# Patient Record
Sex: FEMALE | Race: WHITE | NOT HISPANIC OR LATINO | Employment: FULL TIME | URBAN - NONMETROPOLITAN AREA
[De-identification: names, ages, dates, MRNs, and addresses within clinical notes are randomized per-mention and may not be internally consistent; named-entity substitution may affect disease eponyms.]

---

## 2023-06-29 DIAGNOSIS — K21.9 GASTROESOPHAGEAL REFLUX DISEASE WITHOUT ESOPHAGITIS: ICD-10-CM

## 2023-06-29 RX ORDER — OMEPRAZOLE 40 MG/1
40 CAPSULE, DELAYED RELEASE ORAL DAILY
COMMUNITY
Start: 2023-02-15 | End: 2023-06-29 | Stop reason: SDUPTHER

## 2023-06-29 RX ORDER — OMEPRAZOLE 40 MG/1
40 CAPSULE, DELAYED RELEASE ORAL DAILY
Qty: 90 CAPSULE | Refills: 0 | Status: SHIPPED | OUTPATIENT
Start: 2023-06-29 | End: 2024-01-10 | Stop reason: SDUPTHER

## 2023-06-29 RX ORDER — AMLODIPINE BESYLATE 5 MG/1
5 TABLET ORAL DAILY
COMMUNITY
End: 2023-08-28 | Stop reason: SDUPTHER

## 2023-07-07 ENCOUNTER — OFFICE VISIT (OUTPATIENT)
Dept: PRIMARY CARE | Facility: CLINIC | Age: 58
End: 2023-07-07
Payer: COMMERCIAL

## 2023-07-07 VITALS
SYSTOLIC BLOOD PRESSURE: 135 MMHG | HEIGHT: 66 IN | HEART RATE: 86 BPM | WEIGHT: 170 LBS | OXYGEN SATURATION: 94 % | DIASTOLIC BLOOD PRESSURE: 76 MMHG | BODY MASS INDEX: 27.32 KG/M2

## 2023-07-07 DIAGNOSIS — Z12.31 ENCOUNTER FOR SCREENING MAMMOGRAM FOR MALIGNANT NEOPLASM OF BREAST: ICD-10-CM

## 2023-07-07 DIAGNOSIS — Z13.220 LIPID SCREENING: ICD-10-CM

## 2023-07-07 DIAGNOSIS — Z00.00 HEALTH CARE MAINTENANCE: ICD-10-CM

## 2023-07-07 DIAGNOSIS — R53.83 OTHER FATIGUE: ICD-10-CM

## 2023-07-07 PROBLEM — E78.00 HIGH CHOLESTEROL: Status: ACTIVE | Noted: 2023-07-07

## 2023-07-07 PROBLEM — J11.1 INFLUENZA: Status: RESOLVED | Noted: 2023-07-07 | Resolved: 2023-07-07

## 2023-07-07 PROBLEM — R09.89 CAROTID BRUIT: Status: ACTIVE | Noted: 2023-07-07

## 2023-07-07 PROBLEM — M25.511 RIGHT SHOULDER PAIN: Status: ACTIVE | Noted: 2023-07-07

## 2023-07-07 PROBLEM — J20.9 ACUTE BRONCHITIS: Status: RESOLVED | Noted: 2023-07-07 | Resolved: 2023-07-07

## 2023-07-07 PROBLEM — I10 HTN (HYPERTENSION): Status: ACTIVE | Noted: 2023-07-07

## 2023-07-07 PROBLEM — J01.00 ACUTE MAXILLARY SINUSITIS: Status: RESOLVED | Noted: 2023-07-07 | Resolved: 2023-07-07

## 2023-07-07 PROBLEM — H66.90 ACUTE OTITIS MEDIA: Status: RESOLVED | Noted: 2023-07-07 | Resolved: 2023-07-07

## 2023-07-07 PROBLEM — J02.8 ACUTE PHARYNGITIS DUE TO OTHER SPECIFIED ORGANISMS: Status: RESOLVED | Noted: 2023-07-07 | Resolved: 2023-07-07

## 2023-07-07 PROBLEM — J06.9 ACUTE UPPER RESPIRATORY INFECTION: Status: RESOLVED | Noted: 2023-07-07 | Resolved: 2023-07-07

## 2023-07-07 PROBLEM — H92.10 OTORRHEA: Status: ACTIVE | Noted: 2023-07-07

## 2023-07-07 PROBLEM — R42 DIZZINESS: Status: RESOLVED | Noted: 2023-07-07 | Resolved: 2023-07-07

## 2023-07-07 PROBLEM — M75.51 BURSITIS OF RIGHT SHOULDER: Status: ACTIVE | Noted: 2023-07-07

## 2023-07-07 PROBLEM — J02.9 SORE THROAT: Status: RESOLVED | Noted: 2023-07-07 | Resolved: 2023-07-07

## 2023-07-07 PROBLEM — R05.9 COUGH: Status: RESOLVED | Noted: 2023-07-07 | Resolved: 2023-07-07

## 2023-07-07 PROBLEM — R00.2 PALPITATIONS: Status: ACTIVE | Noted: 2023-07-07

## 2023-07-07 PROBLEM — H60.91 OTITIS EXTERNA OF RIGHT EAR: Status: ACTIVE | Noted: 2023-07-07

## 2023-07-07 PROCEDURE — 3075F SYST BP GE 130 - 139MM HG: CPT | Performed by: INTERNAL MEDICINE

## 2023-07-07 PROCEDURE — 1036F TOBACCO NON-USER: CPT | Performed by: INTERNAL MEDICINE

## 2023-07-07 PROCEDURE — 3078F DIAST BP <80 MM HG: CPT | Performed by: INTERNAL MEDICINE

## 2023-07-07 PROCEDURE — 99214 OFFICE O/P EST MOD 30 MIN: CPT | Performed by: INTERNAL MEDICINE

## 2023-07-07 ASSESSMENT — PATIENT HEALTH QUESTIONNAIRE - PHQ9
2. FEELING DOWN, DEPRESSED OR HOPELESS: NOT AT ALL
SUM OF ALL RESPONSES TO PHQ9 QUESTIONS 1 AND 2: 0
1. LITTLE INTEREST OR PLEASURE IN DOING THINGS: NOT AT ALL

## 2023-07-07 ASSESSMENT — ENCOUNTER SYMPTOMS: HYPERTENSION: 1

## 2023-07-07 NOTE — PROGRESS NOTES
Patient ID:   Ruchi Yun is a 58 y.o. female with PMH remarkable for HTN, HLD who presents to the office today for Follow-up and Hypertension.    Hypertension  This is a chronic problem. The current episode started more than 1 month ago. The problem has been waxing and waning since onset. The problem is controlled. Associated symptoms include anxiety. Past treatments include calcium channel blockers.     HEALTH MAINTENANCE: FOLLOW UP    SOCIAL HISTORY:  Social History     Tobacco Use    Smoking status: Never    Smokeless tobacco: Never   Substance Use Topics    Alcohol use: Never    Drug use: Never       IMMUNIZATIONS:  Immunization History   Administered Date(s) Administered    Moderna SARS-CoV-2 Vaccination 01/15/2021, 02/12/2021, 12/07/2021       REVIEW OF SYSTEMS:  Review of Systems   All other systems reviewed and are negative.    12 point review of systems negative unless stated above in HPI    ALLERGIES:  Allergies   Allergen Reactions    Penicillins Hives    Sulfa (Sulfonamide Antibiotics) Hives        VITAL SIGNS:  Vitals:    07/07/23 1215   BP: 135/76   Pulse: 86   SpO2: 94%       PHYSICAL EXAM:  Physical Exam  Vitals reviewed.   Constitutional:       General: She is not in acute distress.     Appearance: Normal appearance. She is not ill-appearing.   HENT:      Head: Normocephalic and atraumatic.      Right Ear: Tympanic membrane and external ear normal.      Left Ear: Tympanic membrane and external ear normal.      Nose: Nose normal.      Mouth/Throat:      Mouth: Mucous membranes are moist.      Pharynx: Oropharynx is clear.   Eyes:      Conjunctiva/sclera: Conjunctivae normal.      Pupils: Pupils are equal, round, and reactive to light.   Cardiovascular:      Rate and Rhythm: Normal rate and regular rhythm.      Heart sounds: Normal heart sounds. No murmur heard.  Pulmonary:      Effort: Pulmonary effort is normal. No respiratory distress.      Breath sounds: Normal breath sounds. No wheezing.    Abdominal:      General: There is no distension.      Palpations: Abdomen is soft. There is no mass.      Tenderness: There is no abdominal tenderness.   Musculoskeletal:         General: Normal range of motion.      Cervical back: Normal range of motion and neck supple.   Skin:     General: Skin is warm and dry.   Neurological:      General: No focal deficit present.      Mental Status: She is alert and oriented to person, place, and time.      Sensory: No sensory deficit.      Motor: No weakness.      Coordination: Coordination normal.      Gait: Gait normal.   Psychiatric:         Mood and Affect: Mood normal.         Behavior: Behavior normal.     MEDICATIONS:  Current Outpatient Medications on File Prior to Visit   Medication Sig Dispense Refill    amLODIPine (Norvasc) 5 mg tablet Take 1 tablet (5 mg) by mouth once daily.      omeprazole (PriLOSEC) 40 mg DR capsule Take 1 capsule (40 mg) by mouth once daily. 90 capsule 0     No current facility-administered medications on file prior to visit.      LABORATORY DATA:  Lab Results   Component Value Date    WBC 7.1 09/20/2021    HGB 13.6 09/20/2021    HCT 41.4 09/20/2021     09/20/2021    CHOL 241 (H) 12/26/2017    TRIG 68 12/26/2017    HDL 63.2 12/26/2017    ALT 9 12/26/2017    AST 15 12/26/2017     09/20/2021    K 4.1 09/20/2021     09/20/2021    CREATININE 0.95 09/20/2021    BUN 12 09/20/2021    CO2 30 09/20/2021       ASSESSMENT AND PLAN:  Assessment/Plan   Diagnoses and all orders for this visit:  Encounter for screening mammogram for malignant neoplasm of breast  -     BI mammo bilateral screening tomosynthesis; Future  Health care maintenance  -     CBC and Auto Differential; Future  -     Comprehensive Metabolic Panel; Future  -     TSH with reflex to Free T4 if abnormal; Future  -     Vitamin D, Total; Future  -     Vitamin B12; Future  Lipid screening  -     Lipid Panel; Future  Other fatigue  -     CBC and Auto Differential; Future  -      Comprehensive Metabolic Panel; Future  -     TSH with reflex to Free T4 if abnormal; Future  -     Vitamin D, Total; Future  -     Vitamin B12; Future      --------------------  Written by Traci Roldan RN, acting as a scribe for Dr. Wallis. This note accurately reflects the work and decisions made by Dr. Wallis.     I, Dr. Wallis, attest all medical record entries made by the scribe were under my direction and were personally dictated by me. I have reviewed the chart and agree that the record accurately reflects my performance of the history, physical exam, and assessment and plan.

## 2023-07-22 ENCOUNTER — LAB (OUTPATIENT)
Dept: LAB | Facility: LAB | Age: 58
End: 2023-07-22
Payer: COMMERCIAL

## 2023-07-22 DIAGNOSIS — Z13.220 LIPID SCREENING: ICD-10-CM

## 2023-07-22 DIAGNOSIS — R53.83 OTHER FATIGUE: ICD-10-CM

## 2023-07-22 DIAGNOSIS — Z00.00 HEALTH CARE MAINTENANCE: ICD-10-CM

## 2023-07-22 LAB
ALANINE AMINOTRANSFERASE (SGPT) (U/L) IN SER/PLAS: 16 U/L (ref 7–45)
ALBUMIN (G/DL) IN SER/PLAS: 4.3 G/DL (ref 3.4–5)
ALKALINE PHOSPHATASE (U/L) IN SER/PLAS: 76 U/L (ref 33–136)
ANION GAP IN SER/PLAS: 15 MMOL/L (ref 10–20)
ASPARTATE AMINOTRANSFERASE (SGOT) (U/L) IN SER/PLAS: 21 U/L (ref 9–39)
BASOPHILS (10*3/UL) IN BLOOD BY AUTOMATED COUNT: 0.04 X10E9/L (ref 0–0.1)
BASOPHILS/100 LEUKOCYTES IN BLOOD BY AUTOMATED COUNT: 0.7 % (ref 0–2)
BILIRUBIN TOTAL (MG/DL) IN SER/PLAS: 0.3 MG/DL (ref 0–1.2)
CALCIDIOL (25 OH VITAMIN D3) (NG/ML) IN SER/PLAS: 44 NG/ML
CALCIUM (MG/DL) IN SER/PLAS: 9 MG/DL (ref 8.6–10.3)
CARBON DIOXIDE, TOTAL (MMOL/L) IN SER/PLAS: 24 MMOL/L (ref 21–32)
CHLORIDE (MMOL/L) IN SER/PLAS: 104 MMOL/L (ref 98–107)
CHOLESTEROL (MG/DL) IN SER/PLAS: 230 MG/DL (ref 0–199)
CHOLESTEROL IN HDL (MG/DL) IN SER/PLAS: 57.6 MG/DL
CHOLESTEROL/HDL RATIO: 4
COBALAMIN (VITAMIN B12) (PG/ML) IN SER/PLAS: 302 PG/ML (ref 211–911)
CREATININE (MG/DL) IN SER/PLAS: 0.84 MG/DL (ref 0.5–1.05)
EOSINOPHILS (10*3/UL) IN BLOOD BY AUTOMATED COUNT: 0.08 X10E9/L (ref 0–0.7)
EOSINOPHILS/100 LEUKOCYTES IN BLOOD BY AUTOMATED COUNT: 1.4 % (ref 0–6)
ERYTHROCYTE DISTRIBUTION WIDTH (RATIO) BY AUTOMATED COUNT: 13.1 % (ref 11.5–14.5)
ERYTHROCYTE MEAN CORPUSCULAR HEMOGLOBIN CONCENTRATION (G/DL) BY AUTOMATED: 31.9 G/DL (ref 32–36)
ERYTHROCYTE MEAN CORPUSCULAR VOLUME (FL) BY AUTOMATED COUNT: 101 FL (ref 80–100)
ERYTHROCYTES (10*6/UL) IN BLOOD BY AUTOMATED COUNT: 4.02 X10E12/L (ref 4–5.2)
GFR FEMALE: 79 ML/MIN/1.73M2
GLUCOSE (MG/DL) IN SER/PLAS: 93 MG/DL (ref 74–99)
HEMATOCRIT (%) IN BLOOD BY AUTOMATED COUNT: 40.7 % (ref 36–46)
HEMOGLOBIN (G/DL) IN BLOOD: 13 G/DL (ref 12–16)
IMMATURE GRANULOCYTES/100 LEUKOCYTES IN BLOOD BY AUTOMATED COUNT: 0.2 % (ref 0–0.9)
LDL: 156 MG/DL (ref 0–99)
LEUKOCYTES (10*3/UL) IN BLOOD BY AUTOMATED COUNT: 5.5 X10E9/L (ref 4.4–11.3)
LYMPHOCYTES (10*3/UL) IN BLOOD BY AUTOMATED COUNT: 1.3 X10E9/L (ref 1.2–4.8)
LYMPHOCYTES/100 LEUKOCYTES IN BLOOD BY AUTOMATED COUNT: 23.6 % (ref 13–44)
MONOCYTES (10*3/UL) IN BLOOD BY AUTOMATED COUNT: 0.45 X10E9/L (ref 0.1–1)
MONOCYTES/100 LEUKOCYTES IN BLOOD BY AUTOMATED COUNT: 8.2 % (ref 2–10)
NEUTROPHILS (10*3/UL) IN BLOOD BY AUTOMATED COUNT: 3.64 X10E9/L (ref 1.2–7.7)
NEUTROPHILS/100 LEUKOCYTES IN BLOOD BY AUTOMATED COUNT: 65.9 % (ref 40–80)
PLATELETS (10*3/UL) IN BLOOD AUTOMATED COUNT: 298 X10E9/L (ref 150–450)
POTASSIUM (MMOL/L) IN SER/PLAS: 3.8 MMOL/L (ref 3.5–5.3)
PROTEIN TOTAL: 7.1 G/DL (ref 6.4–8.2)
SODIUM (MMOL/L) IN SER/PLAS: 139 MMOL/L (ref 136–145)
THYROTROPIN (MIU/L) IN SER/PLAS BY DETECTION LIMIT <= 0.05 MIU/L: 0.81 MIU/L (ref 0.44–3.98)
TRIGLYCERIDE (MG/DL) IN SER/PLAS: 80 MG/DL (ref 0–149)
UREA NITROGEN (MG/DL) IN SER/PLAS: 15 MG/DL (ref 6–23)
VLDL: 16 MG/DL (ref 0–40)

## 2023-07-22 PROCEDURE — 82306 VITAMIN D 25 HYDROXY: CPT

## 2023-07-22 PROCEDURE — 36415 COLL VENOUS BLD VENIPUNCTURE: CPT

## 2023-07-22 PROCEDURE — 82607 VITAMIN B-12: CPT

## 2023-07-22 PROCEDURE — 80053 COMPREHEN METABOLIC PANEL: CPT

## 2023-07-22 PROCEDURE — 85025 COMPLETE CBC W/AUTO DIFF WBC: CPT

## 2023-07-22 PROCEDURE — 80061 LIPID PANEL: CPT

## 2023-07-22 PROCEDURE — 84443 ASSAY THYROID STIM HORMONE: CPT

## 2023-07-24 DIAGNOSIS — E53.8 B12 DEFICIENCY: ICD-10-CM

## 2023-07-24 DIAGNOSIS — E78.5 DYSLIPIDEMIA: Primary | ICD-10-CM

## 2023-07-24 RX ORDER — UBIDECARENONE 75 MG
500 CAPSULE ORAL DAILY
Status: DISCONTINUED | OUTPATIENT
Start: 2023-07-24 | End: 2024-01-10 | Stop reason: ENTERED-IN-ERROR

## 2023-07-24 RX ORDER — ATORVASTATIN CALCIUM 20 MG/1
20 TABLET, FILM COATED ORAL DAILY
Qty: 30 TABLET | Refills: 5 | Status: SHIPPED | OUTPATIENT
Start: 2023-07-24 | End: 2023-11-29 | Stop reason: SDUPTHER

## 2023-07-25 DIAGNOSIS — E78.00 HIGH CHOLESTEROL: ICD-10-CM

## 2023-07-25 RX ORDER — ATORVASTATIN CALCIUM 20 MG/1
20 TABLET, FILM COATED ORAL DAILY
Qty: 90 TABLET | Refills: 0 | Status: SHIPPED | OUTPATIENT
Start: 2023-07-25 | End: 2024-01-10 | Stop reason: WASHOUT

## 2023-08-28 DIAGNOSIS — I10 HYPERTENSION, UNSPECIFIED TYPE: ICD-10-CM

## 2023-08-28 RX ORDER — AMLODIPINE BESYLATE 5 MG/1
5 TABLET ORAL DAILY
Qty: 90 TABLET | Refills: 0 | Status: SHIPPED | OUTPATIENT
Start: 2023-08-28 | End: 2023-11-29 | Stop reason: SDUPTHER

## 2023-11-29 DIAGNOSIS — E78.5 DYSLIPIDEMIA: ICD-10-CM

## 2023-11-29 DIAGNOSIS — I10 HYPERTENSION, UNSPECIFIED TYPE: ICD-10-CM

## 2023-11-29 RX ORDER — ATORVASTATIN CALCIUM 20 MG/1
20 TABLET, FILM COATED ORAL DAILY
Qty: 30 TABLET | Refills: 0 | Status: SHIPPED | OUTPATIENT
Start: 2023-11-29 | End: 2024-01-10 | Stop reason: SDUPTHER

## 2023-11-29 RX ORDER — AMLODIPINE BESYLATE 5 MG/1
5 TABLET ORAL DAILY
Qty: 30 TABLET | Refills: 0 | Status: SHIPPED | OUTPATIENT
Start: 2023-11-29 | End: 2024-01-10 | Stop reason: SDUPTHER

## 2024-01-10 ENCOUNTER — OFFICE VISIT (OUTPATIENT)
Dept: PRIMARY CARE | Facility: CLINIC | Age: 59
End: 2024-01-10
Payer: COMMERCIAL

## 2024-01-10 VITALS
WEIGHT: 169 LBS | HEART RATE: 85 BPM | RESPIRATION RATE: 16 BRPM | HEIGHT: 66 IN | OXYGEN SATURATION: 98 % | DIASTOLIC BLOOD PRESSURE: 78 MMHG | BODY MASS INDEX: 27.16 KG/M2 | SYSTOLIC BLOOD PRESSURE: 131 MMHG

## 2024-01-10 DIAGNOSIS — Z12.11 ENCOUNTER FOR SCREENING COLONOSCOPY: ICD-10-CM

## 2024-01-10 DIAGNOSIS — R35.0 FREQUENCY OF URINATION: ICD-10-CM

## 2024-01-10 DIAGNOSIS — I10 HYPERTENSION, UNSPECIFIED TYPE: ICD-10-CM

## 2024-01-10 DIAGNOSIS — R30.0 DYSURIA: ICD-10-CM

## 2024-01-10 DIAGNOSIS — E78.5 DYSLIPIDEMIA: ICD-10-CM

## 2024-01-10 DIAGNOSIS — K21.9 GASTROESOPHAGEAL REFLUX DISEASE WITHOUT ESOPHAGITIS: ICD-10-CM

## 2024-01-10 LAB
POC APPEARANCE, URINE: ABNORMAL
POC BILIRUBIN, URINE: NEGATIVE
POC BLOOD, URINE: ABNORMAL
POC COLOR, URINE: YELLOW
POC GLUCOSE, URINE: NEGATIVE MG/DL
POC KETONES, URINE: NEGATIVE MG/DL
POC LEUKOCYTES, URINE: ABNORMAL
POC NITRITE,URINE: NEGATIVE
POC PH, URINE: 5.5 PH
POC PROTEIN, URINE: NEGATIVE MG/DL
POC SPECIFIC GRAVITY, URINE: 1.01
POC UROBILINOGEN, URINE: 0.2 EU/DL

## 2024-01-10 PROCEDURE — 87086 URINE CULTURE/COLONY COUNT: CPT

## 2024-01-10 PROCEDURE — 1036F TOBACCO NON-USER: CPT | Performed by: INTERNAL MEDICINE

## 2024-01-10 PROCEDURE — 3078F DIAST BP <80 MM HG: CPT | Performed by: INTERNAL MEDICINE

## 2024-01-10 PROCEDURE — 99214 OFFICE O/P EST MOD 30 MIN: CPT | Performed by: INTERNAL MEDICINE

## 2024-01-10 PROCEDURE — 3075F SYST BP GE 130 - 139MM HG: CPT | Performed by: INTERNAL MEDICINE

## 2024-01-10 PROCEDURE — 81003 URINALYSIS AUTO W/O SCOPE: CPT | Performed by: INTERNAL MEDICINE

## 2024-01-10 RX ORDER — CIPROFLOXACIN 500 MG/1
500 TABLET ORAL 2 TIMES DAILY
Qty: 14 TABLET | Refills: 0 | Status: SHIPPED | OUTPATIENT
Start: 2024-01-10 | End: 2024-01-17

## 2024-01-10 RX ORDER — AMLODIPINE BESYLATE 5 MG/1
5 TABLET ORAL DAILY
Qty: 90 TABLET | Refills: 1 | Status: SHIPPED | OUTPATIENT
Start: 2024-01-10 | End: 2024-04-11 | Stop reason: SDUPTHER

## 2024-01-10 RX ORDER — ATORVASTATIN CALCIUM 20 MG/1
20 TABLET, FILM COATED ORAL DAILY
Qty: 90 TABLET | Refills: 1 | Status: SHIPPED | OUTPATIENT
Start: 2024-01-10 | End: 2024-04-11 | Stop reason: SDUPTHER

## 2024-01-10 RX ORDER — OMEPRAZOLE 40 MG/1
40 CAPSULE, DELAYED RELEASE ORAL DAILY
Qty: 90 CAPSULE | Refills: 1 | Status: SHIPPED | OUTPATIENT
Start: 2024-01-10 | End: 2024-04-11 | Stop reason: SDUPTHER

## 2024-01-10 ASSESSMENT — PATIENT HEALTH QUESTIONNAIRE - PHQ9
2. FEELING DOWN, DEPRESSED OR HOPELESS: NOT AT ALL
1. LITTLE INTEREST OR PLEASURE IN DOING THINGS: NOT AT ALL
SUM OF ALL RESPONSES TO PHQ9 QUESTIONS 1 AND 2: 0

## 2024-01-10 ASSESSMENT — ENCOUNTER SYMPTOMS
RESPIRATORY NEGATIVE: 1
NEUROLOGICAL NEGATIVE: 1
CARDIOVASCULAR NEGATIVE: 1
GASTROINTESTINAL NEGATIVE: 1
FREQUENCY: 1
CONSTITUTIONAL NEGATIVE: 1
PSYCHIATRIC NEGATIVE: 1
BACK PAIN: 1
DIFFICULTY URINATING: 1

## 2024-01-10 ASSESSMENT — PAIN SCALES - GENERAL: PAINLEVEL: 0-NO PAIN

## 2024-01-10 NOTE — PATIENT INSTRUCTIONS
It was great to see you in the office today! Here is what we discussed at your visit today:  Please continue to take your current medications   Schedule an appointment with Dr. Montes for possible EGD and colonoscopy  We have sent a prescription in for your urinary symptoms, Cipro, take as directed  We will call you with urine culture results  Follow up in four months

## 2024-01-10 NOTE — PROGRESS NOTES
Patient ID: She states she has been feeling ok. She states she has had shooting pain across her lower back, which has been on and off over past couple weeks. She states she has also had urinary frequency. She does not think she has had fever or chills. She denies any chest pain or cough. She denies any N/V/D/C. She states her job is active, but she does not exercise regularly. She states that she does not drink as much water as she should. She denies depression, states she sleeps ok. She states that her acid reflux has been worse,     HPI Ruchi Yun is a 58 y.o. female with PMH remarkable for HTN, HLD who presents to the office today for Follow-up, Urinary Frequency, Difficulty Urinating, and Back Pain (X2 weeks).    HEALTH MAINTENANCE: FOLLOW UP  Smoking: never smoker  Mammogram (40-75): 7/22/23  Pap smear (21-65): per OB/GYN  Labs: 7/22/23  Colonoscopy (45-75): 12/18/18   Lung cancer screening (55-80 + 30 pack year + smoking/quit in last 15 years): n/a    SOCIAL HISTORY:  Social History     Tobacco Use    Smoking status: Never    Smokeless tobacco: Never   Substance Use Topics    Alcohol use: Never    Drug use: Never     IMMUNIZATIONS:  Immunization History   Administered Date(s) Administered    Flu vaccine (IIV4), preservative free *Check age/dose* 11/10/2016    Influenza, injectable, quadrivalent 10/20/2021    Moderna SARS-CoV-2 Vaccination 01/15/2021, 02/12/2021, 12/07/2021    Novel influenza-H1N1-09, preservative-free 12/23/2009    Td (adult), unspecified 11/21/2002     REVIEW OF SYSTEMS:  Review of Systems   Constitutional: Negative.    Respiratory: Negative.     Cardiovascular: Negative.    Gastrointestinal: Negative.    Genitourinary:  Positive for difficulty urinating and frequency.   Musculoskeletal:  Positive for back pain.   Neurological: Negative.    Psychiatric/Behavioral: Negative.         ALLERGIES:  Allergies   Allergen Reactions    Penicillins Hives    Sulfa (Sulfonamide Antibiotics) Hives       VITAL SIGNS:  Vitals:    01/10/24 1219   BP: 131/78   Pulse: 85   Resp: 16   SpO2: 98%     Physical Exam  Vitals reviewed.   Constitutional:       Appearance: Normal appearance.   HENT:      Head: Normocephalic and atraumatic.   Cardiovascular:      Rate and Rhythm: Normal rate and regular rhythm.      Pulses: Normal pulses.      Heart sounds: Normal heart sounds.   Pulmonary:      Effort: Pulmonary effort is normal.      Breath sounds: Normal breath sounds.   Abdominal:      General: Bowel sounds are normal.      Palpations: Abdomen is soft.   Musculoskeletal:      Comments: Tenderness over left lower back   Skin:     General: Skin is warm and dry.   Neurological:      General: No focal deficit present.      Mental Status: She is alert and oriented to person, place, and time.   Psychiatric:         Mood and Affect: Mood normal.         Behavior: Behavior normal.       MEDICATIONS:  Current Outpatient Medications on File Prior to Visit   Medication Sig Dispense Refill    amLODIPine (Norvasc) 5 mg tablet Take 1 tablet (5 mg) by mouth once daily. 30 tablet 0    atorvastatin (Lipitor) 20 mg tablet Take 1 tablet (20 mg) by mouth once daily. 90 tablet 0    atorvastatin (Lipitor) 20 mg tablet Take 1 tablet (20 mg) by mouth once daily. 30 tablet 0    omeprazole (PriLOSEC) 40 mg DR capsule Take 1 capsule (40 mg) by mouth once daily. 90 capsule 0     Current Facility-Administered Medications on File Prior to Visit   Medication Dose Route Frequency Provider Last Rate Last Admin    cyanocobalamin (Vitamin B-12) tablet 500 mcg  500 mcg oral Daily Giuseppe Stpehenson MD          LABORATORY DATA:  Lab Results   Component Value Date    WBC 5.5 07/22/2023    HGB 13.0 07/22/2023    HCT 40.7 07/22/2023     07/22/2023    CHOL 230 (H) 07/22/2023    TRIG 80 07/22/2023    HDL 57.6 07/22/2023    ALT 16 07/22/2023    AST 21 07/22/2023     07/22/2023    K 3.8 07/22/2023     07/22/2023    CREATININE 0.84 07/22/2023     BUN 15 07/22/2023    CO2 24 07/22/2023    TSH 0.81 07/22/2023     ASSESSMENT AND PLAN:  Assessment/Plan   Diagnoses and all orders for this visit:  Dysuria  -     POCT UA Automated manually resulted  -     Urine Culture  -     ciprofloxacin (Cipro) 500 mg tablet; Take 1 tablet (500 mg) by mouth 2 times a day for 7 days.  Frequency of urination  -     POCT UA Automated manually resulted  -     Urine Culture  Hypertension, unspecified type  -     amLODIPine (Norvasc) 5 mg tablet; Take 1 tablet (5 mg) by mouth once daily.  -     BP today is good, 131/78  Dyslipidemia  -     atorvastatin (Lipitor) 20 mg tablet; Take 1 tablet (20 mg) by mouth once daily.  Gastroesophageal reflux disease without esophagitis  -     Referral to General Surgery; Future for screening colonoscopy, possible EGD as she is having increased acid reflux regardless of what she eats  -     omeprazole (PriLOSEC) 40 mg DR capsule; Take 1 capsule (40 mg) by mouth once daily.    --------------------  Written by Vihba Martinez LPN, acting as a scribe for Dr. Wallis. This note accurately reflects the work and decisions made by Dr. Wallis.     I, Dr. Wallis, attest all medical record entries made by the scribe were under my direction and were personally dictated by me. I have reviewed the chart and agree that the record accurately reflects my performance of the history, physical exam, and assessment and plan.

## 2024-01-12 LAB — BACTERIA UR CULT: NORMAL

## 2024-01-19 ENCOUNTER — OFFICE VISIT (OUTPATIENT)
Dept: SURGERY | Facility: CLINIC | Age: 59
End: 2024-01-19
Payer: COMMERCIAL

## 2024-01-19 VITALS
WEIGHT: 172 LBS | TEMPERATURE: 97 F | HEIGHT: 66 IN | SYSTOLIC BLOOD PRESSURE: 132 MMHG | HEART RATE: 77 BPM | BODY MASS INDEX: 27.64 KG/M2 | DIASTOLIC BLOOD PRESSURE: 70 MMHG

## 2024-01-19 DIAGNOSIS — K21.9 GASTROESOPHAGEAL REFLUX DISEASE WITHOUT ESOPHAGITIS: Primary | ICD-10-CM

## 2024-01-19 DIAGNOSIS — Z12.11 ENCOUNTER FOR SCREENING COLONOSCOPY: ICD-10-CM

## 2024-01-19 PROCEDURE — 1036F TOBACCO NON-USER: CPT | Performed by: SURGERY

## 2024-01-19 PROCEDURE — 99204 OFFICE O/P NEW MOD 45 MIN: CPT | Performed by: SURGERY

## 2024-01-19 PROCEDURE — 3078F DIAST BP <80 MM HG: CPT | Performed by: SURGERY

## 2024-01-19 PROCEDURE — 3075F SYST BP GE 130 - 139MM HG: CPT | Performed by: SURGERY

## 2024-01-19 ASSESSMENT — ENCOUNTER SYMPTOMS: NAUSEA: 1

## 2024-01-19 NOTE — PROGRESS NOTES
Subjective   Patient ID: Ruchi Yun is a 58 y.o. female who presents for epigastric discomfort and discussion regarding colonoscopy.    HPI     This patient presented with nonspecific epigastric discomfort feeling unwell nausea and sour taste in her stomach.  She also wanted to discuss a colonoscopy per her primary care physician.  She did have a normal colonoscopy in .  Recommendation made for 10-year follow-up.  She does not take any aspirin or ibuprofen.      Past Medical History:   Diagnosis Date    Acute maxillary sinusitis 2023    Acute otitis media 2023    Acute pharyngitis due to other specified organisms 2023    Acute upper respiratory infection 2023    Cough 2023    Dizziness 2023    Sore throat 2023        Current Outpatient Medications on File Prior to Visit   Medication Sig Dispense Refill    amLODIPine (Norvasc) 5 mg tablet Take 1 tablet (5 mg) by mouth once daily. 90 tablet 1    atorvastatin (Lipitor) 20 mg tablet Take 1 tablet (20 mg) by mouth once daily. 90 tablet 1    omeprazole (PriLOSEC) 40 mg DR capsule Take 1 capsule (40 mg) by mouth once daily. 90 capsule 1    [] ciprofloxacin (Cipro) 500 mg tablet Take 1 tablet (500 mg) by mouth 2 times a day for 7 days. 14 tablet 0     No current facility-administered medications on file prior to visit.        Review of Systems   Gastrointestinal:  Positive for nausea.   Also has heartburn.  Vitals:    24 0915   BP: 132/70   Pulse: 77   Temp: 36.1 °C (97 °F)        Objective     Physical Exam  Vitals reviewed. Exam conducted with a chaperone present.   Constitutional:       Appearance: Normal appearance.   HENT:      Head: Normocephalic.      Nose: Nose normal.      Mouth/Throat:      Pharynx: Oropharynx is clear.   Cardiovascular:      Rate and Rhythm: Normal rate and regular rhythm.      Heart sounds: Normal heart sounds.   Pulmonary:      Effort: Pulmonary effort is normal.       Breath sounds: Normal breath sounds and air entry.   Abdominal:      General: Abdomen is flat.      Palpations: Abdomen is soft. There is no mass.      Tenderness: There is no abdominal tenderness. There is no guarding.      Hernia: A hernia is present. Hernia is present in the umbilical area.      Comments: Small incarcerated vocal hernia   Musculoskeletal:         General: Normal range of motion.      Cervical back: Normal range of motion.   Skin:     General: Skin is warm.   Neurological:      General: No focal deficit present.      Mental Status: She is alert.   Psychiatric:         Mood and Affect: Mood normal.         Problem List Items Addressed This Visit    None  Visit Diagnoses       Gastroesophageal reflux disease without esophagitis        Encounter for screening colonoscopy                 Assessment/Plan   Discussed all the findings with the patient.  Continue proton pump inhibitor.  Will schedule EGD per patient request.  No indication for colonoscopy until 2028.  ESOPHAGOGASTRODODENOSCOPY/ BIOPSIES 2.13.24    Risks include, but not limited to pain, infection, bleeding, perforation,  missed lesions, aspiration, risk of cardiac, pulmonary, neurologic, locomotor, anesthetic events,  and other unforeseen complications including death.    Parish Montes MD

## 2024-01-19 NOTE — PATIENT INSTRUCTIONS
I will continue the oral proton pump inhibitor that you have been prescribed.  Will schedule your EGD for the 13th of February 2024.  Per current guidelines and after review of your colonoscopy you do not need another colonoscopy until the year of 2028.  My office will provide you with preoperative instructions for your procedure

## 2024-02-05 ENCOUNTER — ANESTHESIA EVENT (OUTPATIENT)
Dept: ANESTHESIOLOGY | Facility: HOSPITAL | Age: 59
End: 2024-02-05

## 2024-02-05 ENCOUNTER — PRE-ADMISSION TESTING (OUTPATIENT)
Dept: PREADMISSION TESTING | Facility: HOSPITAL | Age: 59
End: 2024-02-05
Payer: COMMERCIAL

## 2024-02-05 PROBLEM — K21.9 GASTROESOPHAGEAL REFLUX DISEASE: Status: ACTIVE | Noted: 2024-02-05

## 2024-02-05 ASSESSMENT — DUKE ACTIVITY SCORE INDEX (DASI)
CAN YOU DO HEAVY WORK AROUND THE HOUSE LIKE SCRUBBING FLOORS OR LIFTING AND MOVING HEAVY FURNITURE: YES
CAN YOU PARTICIPATE IN STRENOUS SPORTS LIKE SWIMMING, SINGLES TENNIS, FOOTBALL, BASKETBALL, OR SKIING: YES
CAN YOU PARTICIPATE IN MODERATE RECREATIONAL ACTIVITIES LIKE GOLF, BOWLING, DANCING, DOUBLES TENNIS OR THROWING A BASEBALL OR FOOTBALL: YES
CAN YOU DO LIGHT WORK AROUND THE HOUSE LIKE DUSTING OR WASHING DISHES: YES
TOTAL_SCORE: 58.2
CAN YOU DO MODERATE WORK AROUND THE HOUSE LIKE VACUUMING, SWEEPING FLOORS OR CARRYING GROCERIES: YES
CAN YOU DO YARD WORK LIKE RAKING LEAVES, WEEDING OR PUSHING A MOWER: YES
CAN YOU CLIMB A FLIGHT OF STAIRS OR WALK UP A HILL: YES
CAN YOU TAKE CARE OF YOURSELF (EAT, DRESS, BATHE, OR USE TOILET): YES
DASI METS SCORE: 9.9
CAN YOU HAVE SEXUAL RELATIONS: YES
CAN YOU RUN A SHORT DISTANCE: YES
CAN YOU WALK A BLOCK OR TWO ON LEVEL GROUND: YES
CAN YOU WALK INDOORS, SUCH AS AROUND YOUR HOUSE: YES

## 2024-02-05 NOTE — ANESTHESIA PREPROCEDURE EVALUATION
Patient: Ruchi Yun    Procedure Information    Date: 02/05/24  Reason: PAT         Relevant Problems   Anesthesia (within normal limits)      Cardiovascular   (+) HTN (hypertension)   (+) High cholesterol      Endocrine (within normal limits)      GI   (+) Gastroesophageal reflux disease      /Renal (within normal limits)      Neuro/Psych (within normal limits)      Pulmonary (within normal limits)      GI/Hepatic (within normal limits)      Hematology (within normal limits)      Musculoskeletal (within normal limits)      Eyes, Ears, Nose, and Throat (within normal limits)      Infectious Disease (within normal limits)       Clinical information reviewed:                 Chart reviewed.  No clearances ordered.      There were no vitals filed for this visit.    Past Surgical History:   Procedure Laterality Date    BACK SURGERY  04/16/2014    Back Surgery    MR HEAD ANGIO WO IV CONTRAST  9/20/2021    MR HEAD ANGIO WO IV CONTRAST 9/20/2021 [facility]    MR NECK ANGIO WO IV CONTRAST  9/20/2021    MR NECK ANGIO WO IV CONTRAST 9/20/2021 [facility]    OTHER SURGICAL HISTORY  11/28/2018    Foot surgery     Past Medical History:   Diagnosis Date    Acute maxillary sinusitis 07/07/2023    Acute otitis media 07/07/2023    Acute pharyngitis due to other specified organisms 07/07/2023    Acute upper respiratory infection 07/07/2023    Cough 07/07/2023    Dizziness 07/07/2023    Sore throat 07/07/2023       Current Outpatient Medications:     amLODIPine (Norvasc) 5 mg tablet, Take 1 tablet (5 mg) by mouth once daily., Disp: 90 tablet, Rfl: 1    atorvastatin (Lipitor) 20 mg tablet, Take 1 tablet (20 mg) by mouth once daily., Disp: 90 tablet, Rfl: 1    omeprazole (PriLOSEC) 40 mg DR capsule, Take 1 capsule (40 mg) by mouth once daily., Disp: 90 capsule, Rfl: 1  Prior to Admission medications    Medication Sig Start Date End Date Taking? Authorizing Provider   amLODIPine (Norvasc) 5 mg tablet Take 1  tablet (5 mg) by mouth once daily. 1/10/24   Giuseppe Stephenson MD   atorvastatin (Lipitor) 20 mg tablet Take 1 tablet (20 mg) by mouth once daily. 1/10/24 7/8/24  Giuseppe Stephenson MD   omeprazole (PriLOSEC) 40 mg DR capsule Take 1 capsule (40 mg) by mouth once daily. 1/10/24   Giuseppe Stephenson MD     Allergies   Allergen Reactions    Penicillins Hives    Sulfa (Sulfonamide Antibiotics) Hives     Social History     Tobacco Use    Smoking status: Never    Smokeless tobacco: Never   Substance Use Topics    Alcohol use: Never         Chemistry    Lab Results   Component Value Date/Time     07/22/2023 0956    K 3.8 07/22/2023 0956     07/22/2023 0956    CO2 24 07/22/2023 0956    BUN 15 07/22/2023 0956    CREATININE 0.84 07/22/2023 0956    Lab Results   Component Value Date/Time    CALCIUM 9.0 07/22/2023 0956    ALKPHOS 76 07/22/2023 0956    AST 21 07/22/2023 0956    ALT 16 07/22/2023 0956    BILITOT 0.3 07/22/2023 0956          Lab Results   Component Value Date/Time    WBC 5.5 07/22/2023 0956    HGB 13.0 07/22/2023 0956    HCT 40.7 07/22/2023 0956     07/22/2023 0956           NPO Detail:  No data recorded     PHYSICAL EXAM    Anesthesia Plan    History of general anesthesia?: yes  History of complications of general anesthesia?: no

## 2024-02-05 NOTE — PREPROCEDURE INSTRUCTIONS
Call outpatient surgery the day before between 1-3 pm to get your arrival time.   [phone]    No food the day before or day of procedure, only clear liquids. Stay hydrated.     Follow prep instructions.    You can have clear liquids up to 3 hrs prior to your procedure.  NO DAIRY, NO CREAMER, NO NON DAIRY OR ALMOND, OAT, COCONUT ETC.    Please refrain from smoking THC, cigarettes or vaping prior to your procedure at least 24 hrs.     When  you arrive park in the back ER parking lot.  Come through the ER lobby and take the first elevator to second floor.   Check in on the outpatient surgery window.    Leave all valuables at home and remove all piercing's.      NO driving for 24 hrs if you have had anesthesia or sedation.   You will also need a  if you are receiving sedation.    Bring glasses so you can read what you are signing.

## 2024-02-09 ENCOUNTER — PREP FOR PROCEDURE (OUTPATIENT)
Dept: SURGERY | Facility: HOSPITAL | Age: 59
End: 2024-02-09
Payer: COMMERCIAL

## 2024-02-09 RX ORDER — SODIUM CHLORIDE, SODIUM LACTATE, POTASSIUM CHLORIDE, CALCIUM CHLORIDE 600; 310; 30; 20 MG/100ML; MG/100ML; MG/100ML; MG/100ML
100 INJECTION, SOLUTION INTRAVENOUS CONTINUOUS
Status: CANCELLED | OUTPATIENT
Start: 2024-02-09

## 2024-02-09 RX ORDER — ONDANSETRON HYDROCHLORIDE 2 MG/ML
4 INJECTION, SOLUTION INTRAVENOUS ONCE AS NEEDED
Status: CANCELLED | OUTPATIENT
Start: 2024-02-09

## 2024-02-13 ENCOUNTER — ANESTHESIA (OUTPATIENT)
Dept: GASTROENTEROLOGY | Facility: HOSPITAL | Age: 59
End: 2024-02-13
Payer: COMMERCIAL

## 2024-02-13 ENCOUNTER — ANESTHESIA EVENT (OUTPATIENT)
Dept: GASTROENTEROLOGY | Facility: HOSPITAL | Age: 59
End: 2024-02-13
Payer: COMMERCIAL

## 2024-02-13 ENCOUNTER — HOSPITAL ENCOUNTER (OUTPATIENT)
Dept: GASTROENTEROLOGY | Facility: HOSPITAL | Age: 59
Setting detail: OUTPATIENT SURGERY
Discharge: HOME | End: 2024-02-13
Payer: COMMERCIAL

## 2024-02-13 VITALS
RESPIRATION RATE: 18 BRPM | OXYGEN SATURATION: 96 % | DIASTOLIC BLOOD PRESSURE: 53 MMHG | HEART RATE: 83 BPM | SYSTOLIC BLOOD PRESSURE: 108 MMHG | TEMPERATURE: 97.9 F

## 2024-02-13 DIAGNOSIS — K21.9 GASTROESOPHAGEAL REFLUX DISEASE WITHOUT ESOPHAGITIS: ICD-10-CM

## 2024-02-13 PROCEDURE — 43239 EGD BIOPSY SINGLE/MULTIPLE: CPT | Performed by: SURGERY

## 2024-02-13 PROCEDURE — 0753T DGTZ GLS MCRSCP SLD LEVEL IV: CPT | Mod: TC,SUR,GENLAB | Performed by: SURGERY

## 2024-02-13 PROCEDURE — 3700000001 HC GENERAL ANESTHESIA TIME - INITIAL BASE CHARGE

## 2024-02-13 PROCEDURE — 2500000004 HC RX 250 GENERAL PHARMACY W/ HCPCS (ALT 636 FOR OP/ED): Performed by: NURSE ANESTHETIST, CERTIFIED REGISTERED

## 2024-02-13 PROCEDURE — 2500000004 HC RX 250 GENERAL PHARMACY W/ HCPCS (ALT 636 FOR OP/ED): Performed by: SURGERY

## 2024-02-13 PROCEDURE — 7100000009 HC PHASE TWO TIME - INITIAL BASE CHARGE

## 2024-02-13 PROCEDURE — 7100000010 HC PHASE TWO TIME - EACH INCREMENTAL 1 MINUTE

## 2024-02-13 PROCEDURE — 88305 TISSUE EXAM BY PATHOLOGIST: CPT | Performed by: PATHOLOGY

## 2024-02-13 PROCEDURE — 3700000002 HC GENERAL ANESTHESIA TIME - EACH INCREMENTAL 1 MINUTE

## 2024-02-13 RX ORDER — PROPOFOL 10 MG/ML
INJECTION, EMULSION INTRAVENOUS AS NEEDED
Status: DISCONTINUED | OUTPATIENT
Start: 2024-02-13 | End: 2024-02-13

## 2024-02-13 RX ORDER — ONDANSETRON HYDROCHLORIDE 2 MG/ML
4 INJECTION, SOLUTION INTRAVENOUS ONCE AS NEEDED
Status: DISCONTINUED | OUTPATIENT
Start: 2024-02-13 | End: 2024-02-14 | Stop reason: HOSPADM

## 2024-02-13 RX ORDER — SODIUM CHLORIDE, SODIUM LACTATE, POTASSIUM CHLORIDE, CALCIUM CHLORIDE 600; 310; 30; 20 MG/100ML; MG/100ML; MG/100ML; MG/100ML
100 INJECTION, SOLUTION INTRAVENOUS CONTINUOUS
Status: DISCONTINUED | OUTPATIENT
Start: 2024-02-13 | End: 2024-02-14 | Stop reason: HOSPADM

## 2024-02-13 RX ADMIN — PROPOFOL 50 MG: 10 INJECTION, EMULSION INTRAVENOUS at 08:31

## 2024-02-13 RX ADMIN — SODIUM CHLORIDE, POTASSIUM CHLORIDE, SODIUM LACTATE AND CALCIUM CHLORIDE 100 ML/HR: 600; 310; 30; 20 INJECTION, SOLUTION INTRAVENOUS at 08:00

## 2024-02-13 RX ADMIN — SODIUM CHLORIDE, POTASSIUM CHLORIDE, SODIUM LACTATE AND CALCIUM CHLORIDE: 600; 310; 30; 20 INJECTION, SOLUTION INTRAVENOUS at 08:26

## 2024-02-13 RX ADMIN — PROPOFOL 30 MG: 10 INJECTION, EMULSION INTRAVENOUS at 08:30

## 2024-02-13 RX ADMIN — PROPOFOL 50 MG: 10 INJECTION, EMULSION INTRAVENOUS at 08:29

## 2024-02-13 RX ADMIN — PROPOFOL 110 MG: 10 INJECTION, EMULSION INTRAVENOUS at 08:28

## 2024-02-13 SDOH — HEALTH STABILITY: MENTAL HEALTH: CURRENT SMOKER: 0

## 2024-02-13 ASSESSMENT — PAIN - FUNCTIONAL ASSESSMENT
PAIN_FUNCTIONAL_ASSESSMENT: 0-10

## 2024-02-13 ASSESSMENT — COLUMBIA-SUICIDE SEVERITY RATING SCALE - C-SSRS
6. HAVE YOU EVER DONE ANYTHING, STARTED TO DO ANYTHING, OR PREPARED TO DO ANYTHING TO END YOUR LIFE?: NO
1. IN THE PAST MONTH, HAVE YOU WISHED YOU WERE DEAD OR WISHED YOU COULD GO TO SLEEP AND NOT WAKE UP?: NO
2. HAVE YOU ACTUALLY HAD ANY THOUGHTS OF KILLING YOURSELF?: NO

## 2024-02-13 ASSESSMENT — PAIN SCALES - GENERAL
PAINLEVEL_OUTOF10: 0 - NO PAIN
PAIN_LEVEL: 0
PAINLEVEL_OUTOF10: 0 - NO PAIN
PAINLEVEL_OUTOF10: 0 - NO PAIN

## 2024-02-13 NOTE — ANESTHESIA PREPROCEDURE EVALUATION
Patient: Ruchi Yun    Procedure Information       Date/Time: 02/13/24 0830    Scheduled providers: Parish Montes MD    Procedure: EGD    Location: [facility]            Relevant Problems   Cardiovascular   (+) HTN (hypertension)   (+) High cholesterol      GI   (+) Gastroesophageal reflux disease       Clinical information reviewed:   Tobacco  Allergies  Meds   Med Hx  Surg Hx   Fam Hx  Soc Hx        NPO Detail:  NPO/Void Status  Date of Last Liquid: 02/13/24  Time of Last Liquid: 0500  Date of Last Solid: 02/12/24  Time of Last Solid: 1900         Physical Exam    Airway  Mallampati: II  TM distance: >3 FB  Neck ROM: full     Cardiovascular   Rhythm: regular  Rate: normal     Dental    Pulmonary   (+) wheezes     Abdominal   Abdomen: soft             Anesthesia Plan    History of general anesthesia?: yes  History of complications of general anesthesia?: no    ASA 3     MAC     The patient is not a current smoker.    intravenous induction   Anesthetic plan and risks discussed with patient.  Use of blood products discussed with patient who.    Plan discussed with attending.

## 2024-02-13 NOTE — H&P
History Of Present Illness  Ruchi Yun is a 58 y.o. female presenting for epigastric pain      Past Medical History  Past Medical History:   Diagnosis Date    Acute maxillary sinusitis 07/07/2023    Acute otitis media 07/07/2023    Acute pharyngitis due to other specified organisms 07/07/2023    Acute upper respiratory infection 07/07/2023    Cough 07/07/2023    Dizziness 07/07/2023    Sore throat 07/07/2023       Surgical History  Past Surgical History:   Procedure Laterality Date    BACK SURGERY  04/16/2014    Back Surgery    ENDOMETRIAL ABLATION      MR HEAD ANGIO WO IV CONTRAST  09/20/2021    MR HEAD ANGIO WO IV CONTRAST 9[facility]20/2021 [facility]    MR NECK ANGIO WO IV CONTRAST  09/20/2021    MR NECK ANGIO WO IV CONTRAST 9/20/2021 [facility]    OTHER SURGICAL HISTORY  11/28/2018    Foot surgery        Social History  She reports that she has never smoked. She has never used smokeless tobacco. She reports that she does not drink alcohol and does not use drugs.    Family History  Family History   Problem Relation Name Age of Onset    Diabetes Mother      Heart disease Father          Allergies  Penicillins and Sulfa (sulfonamide antibiotics)    Review of Systems   All other systems reviewed and are negative.       Physical Exam  Constitutional:       Appearance: Normal appearance.   Cardiovascular:      Heart sounds: Normal heart sounds.   Pulmonary:      Breath sounds: Normal breath sounds and air entry.   Abdominal:      General: Abdomen is flat.      Palpations: Abdomen is soft.      Tenderness: There is no abdominal tenderness.   Neurological:      Mental Status: She is alert.          Last Recorded Vitals  There were no vitals taken for this visit.    Relevant Results           Assessment/Plan   Active Problems: Epigastric Pain   There are no active Hospital Problems.      ESOPHAGOGASTRODODENOSCOPY/ BIOPSIES   Risks include, but not limited to pain, infection, bleeding, perforation,   missed lesions, aspiration, risk of cardiac, pulmonary, neurologic, locomotor, anesthetic events,  and other unforeseen complications including death.      Parish Montes MD

## 2024-02-13 NOTE — DISCHARGE INSTRUCTIONS

## 2024-02-13 NOTE — ANESTHESIA POSTPROCEDURE EVALUATION
Patient: Ruchi Yun    Procedure Summary       Date: 02/13/24 Room / Location: [facility]    Anesthesia Start: 0824 Anesthesia Stop: 0842    Procedure: EGD Diagnosis: Gastroesophageal reflux disease without esophagitis    Scheduled Providers: Parish Montes MD Responsible Provider: MELVIN Bautista    Anesthesia Type: MAC ASA Status: 3            Anesthesia Type: MAC    Vitals Value Taken Time   /53 02/13/24 0847   Temp 36.6 °C (97.9 °F) 02/13/24 0837   Pulse 83 02/13/24 0847   Resp 18 02/13/24 0847   SpO2 96 % 02/13/24 0847       Anesthesia Post Evaluation    Patient location during evaluation: bedside  Patient participation: complete - patient participated  Level of consciousness: awake and alert  Pain score: 0  Pain management: adequate  Airway patency: patent  Cardiovascular status: acceptable  Respiratory status: acceptable  Hydration status: acceptable  Postoperative Nausea and Vomiting: none        No notable events documented.

## 2024-02-15 NOTE — ADDENDUM NOTE
Encounter addended by: Robbie Schultz RN on: 2/15/2024 3:10 PM   Actions taken: Contacts section saved

## 2024-02-20 ENCOUNTER — TELEPHONE (OUTPATIENT)
Dept: SURGERY | Facility: CLINIC | Age: 59
End: 2024-02-20
Payer: COMMERCIAL

## 2024-02-20 LAB
LABORATORY COMMENT REPORT: NORMAL
PATH REPORT.FINAL DX SPEC: NORMAL
PATH REPORT.GROSS SPEC: NORMAL
PATH REPORT.TOTAL CANCER: NORMAL

## 2024-02-20 NOTE — TELEPHONE ENCOUNTER
----- Message from Parish Montes MD sent at 2/20/2024  3:05 PM EST -----  Let her know the biopsies were normal

## 2024-04-11 DIAGNOSIS — K21.9 GASTROESOPHAGEAL REFLUX DISEASE WITHOUT ESOPHAGITIS: ICD-10-CM

## 2024-04-11 DIAGNOSIS — E78.5 DYSLIPIDEMIA: ICD-10-CM

## 2024-04-11 DIAGNOSIS — I10 HYPERTENSION, UNSPECIFIED TYPE: ICD-10-CM

## 2024-04-11 RX ORDER — AMLODIPINE BESYLATE 5 MG/1
5 TABLET ORAL DAILY
Qty: 90 TABLET | Refills: 0 | Status: SHIPPED | OUTPATIENT
Start: 2024-04-11

## 2024-04-11 RX ORDER — OMEPRAZOLE 40 MG/1
40 CAPSULE, DELAYED RELEASE ORAL DAILY
Qty: 90 CAPSULE | Refills: 0 | Status: SHIPPED | OUTPATIENT
Start: 2024-04-11

## 2024-04-11 RX ORDER — ATORVASTATIN CALCIUM 20 MG/1
20 TABLET, FILM COATED ORAL DAILY
Qty: 90 TABLET | Refills: 0 | Status: SHIPPED | OUTPATIENT
Start: 2024-04-11 | End: 2024-10-08

## 2024-07-10 DIAGNOSIS — K21.9 GASTROESOPHAGEAL REFLUX DISEASE WITHOUT ESOPHAGITIS: ICD-10-CM

## 2024-07-10 DIAGNOSIS — I10 HYPERTENSION, UNSPECIFIED TYPE: ICD-10-CM

## 2024-07-10 DIAGNOSIS — E78.5 DYSLIPIDEMIA: ICD-10-CM

## 2024-07-10 RX ORDER — ATORVASTATIN CALCIUM 20 MG/1
20 TABLET, FILM COATED ORAL DAILY
Qty: 90 TABLET | Refills: 0 | Status: SHIPPED | OUTPATIENT
Start: 2024-07-10 | End: 2025-01-06

## 2024-07-10 RX ORDER — OMEPRAZOLE 40 MG/1
40 CAPSULE, DELAYED RELEASE ORAL DAILY
Qty: 90 CAPSULE | Refills: 0 | Status: SHIPPED | OUTPATIENT
Start: 2024-07-10

## 2024-07-10 RX ORDER — AMLODIPINE BESYLATE 5 MG/1
5 TABLET ORAL DAILY
Qty: 90 TABLET | Refills: 0 | Status: SHIPPED | OUTPATIENT
Start: 2024-07-10

## 2024-08-06 ENCOUNTER — APPOINTMENT (OUTPATIENT)
Dept: PRIMARY CARE | Facility: CLINIC | Age: 59
End: 2024-08-06
Payer: COMMERCIAL

## 2024-08-06 VITALS
SYSTOLIC BLOOD PRESSURE: 135 MMHG | HEIGHT: 66 IN | RESPIRATION RATE: 18 BRPM | DIASTOLIC BLOOD PRESSURE: 85 MMHG | OXYGEN SATURATION: 97 % | HEART RATE: 78 BPM | WEIGHT: 170 LBS | BODY MASS INDEX: 27.32 KG/M2

## 2024-08-06 DIAGNOSIS — R01.1 MURMUR: ICD-10-CM

## 2024-08-06 DIAGNOSIS — Z12.39 BREAST SCREENING: ICD-10-CM

## 2024-08-06 DIAGNOSIS — Z13.220 LIPID SCREENING: ICD-10-CM

## 2024-08-06 DIAGNOSIS — I15.9 SECONDARY HYPERTENSION: ICD-10-CM

## 2024-08-06 DIAGNOSIS — R42 DIZZINESS: ICD-10-CM

## 2024-08-06 DIAGNOSIS — R00.2 PALPITATIONS: ICD-10-CM

## 2024-08-06 DIAGNOSIS — Z00.00 HEALTH CARE MAINTENANCE: ICD-10-CM

## 2024-08-06 DIAGNOSIS — E78.5 DYSLIPIDEMIA: ICD-10-CM

## 2024-08-06 PROCEDURE — 99214 OFFICE O/P EST MOD 30 MIN: CPT | Performed by: INTERNAL MEDICINE

## 2024-08-06 PROCEDURE — 3079F DIAST BP 80-89 MM HG: CPT | Performed by: INTERNAL MEDICINE

## 2024-08-06 PROCEDURE — 3008F BODY MASS INDEX DOCD: CPT | Performed by: INTERNAL MEDICINE

## 2024-08-06 PROCEDURE — 1036F TOBACCO NON-USER: CPT | Performed by: INTERNAL MEDICINE

## 2024-08-06 PROCEDURE — 3075F SYST BP GE 130 - 139MM HG: CPT | Performed by: INTERNAL MEDICINE

## 2024-08-06 RX ORDER — MAGNESIUM 250 MG
1 TABLET ORAL DAILY
COMMUNITY

## 2024-08-06 RX ORDER — LANOLIN ALCOHOL/MO/W.PET/CERES
1000 CREAM (GRAM) TOPICAL DAILY
COMMUNITY

## 2024-08-06 ASSESSMENT — PATIENT HEALTH QUESTIONNAIRE - PHQ9
SUM OF ALL RESPONSES TO PHQ9 QUESTIONS 1 AND 2: 0
1. LITTLE INTEREST OR PLEASURE IN DOING THINGS: NOT AT ALL
2. FEELING DOWN, DEPRESSED OR HOPELESS: NOT AT ALL

## 2024-08-06 ASSESSMENT — PAIN SCALES - GENERAL: PAINLEVEL: 0-NO PAIN

## 2024-08-06 NOTE — PATIENT INSTRUCTIONS
It was nice to see you in the office today!  As discussed during our visit...     Please have your blood drawn in the next 1-2 weeks.   You need to be FASTING for 12 hours prior to blood draw.  You may have BLACK coffee, BLACK tea and/or water at any time during the fasting time.  We will contact you with the results of your blood work and any necessary adjustments to your plan of care.  If you do not hear from us within 3-5 days of having your blood drawn, please call the [place] office at [phone].     The two closest Outpatient Lab's to this office is:  [facility]  [address] [place] [address]    Hours:   Monday through Friday 7:30am - 4:30pm (Closed 12:30-1pm for lunch)     Or you can go to ...  [facility]  [address]  [place] [address] [phone]    Hours:   Monday through Friday 7:30am - 4:30pm (Closed 12:30-1pm for lunch)   Saturday 8am - 12pm    Website to confirm hours and location OR look up more locations ... [phone]     --------------------------------------------------------  Dr Wallis has ordered a mammogram for you to obtain. This will take approximately 30 minutes. You can schedule this on the [facility] website or through the link below.    Breast cancer cannot be prevented but early detection can keep the disease from spreading and greatly increase your odds for a favorable outcome.    Patients with dense breast tissue, those who have an average or mildly elevated risk for developing breast cancer or those who have a family history of the disease can schedule a low-cost breast cancer screening that takes approximately 10 minutes. This supplemental screening is self-pay ($250*) and is available at several  locations that perform breast MRIs. To learn more about the Fast MRI program, call [phone].    Website with more information and  access to schedule online:  [email or web address]

## 2024-08-06 NOTE — PROGRESS NOTES
"Patient ID:   Ruchi Yun is a 59 y.o. female with PMH remarkable for HTN, HLD who presents to the office today for Follow-up (6 month/Statin giving her leg cramps) and Dizziness.    HEALTH MAINTENANCE: FOLLOW UP   Last Office Visit: 1/10/2024 for FU and c/p back pain, increased urinary frequency. Referral to general surgery was placed for screening colonoscopy and possible EGD d/t increased GERD. Urinalysis and culture done (-ve). Cipro was ordered for UTI.   Mammogram (40-75): 7/22/2023 -ve -> DUE  Pap smear (21-65, or hysterectomy q5yrs): 2021 -ve  Last Labs: 7/22/2023 -> DUE  Colonoscopy (45-75 or age 40 with 1st degree relative dx colon ca): 2018. Had an EGD on 2/13/2024 with Dr Montes.   Carotid US 1/6/2022 showing <50% bilaterally    Increased stress at work.  Has been having dizziness/lightheadedness. Took her BP and it was >140 SBP.   She drank a lot of water and rehydrated herself therefore felt improved.  For ~1.5 wks she has felt dizzy to where she has to sit on the edge of the bed.    Social History     Tobacco Use    Smoking status: Never    Smokeless tobacco: Never   Vaping Use    Vaping status: Never Used   Substance Use Topics    Alcohol use: Never    Drug use: Never     Review of Systems   Musculoskeletal:  Positive for myalgias.   Neurological:  Positive for light-headedness.   All other systems reviewed and are negative.    Visit Vitals  /85 (BP Location: Left arm, Patient Position: Sitting)   Pulse 78   Resp 18   Ht 1.676 m (5' 6\")   Wt 77.1 kg (170 lb)   SpO2 97%   BMI 27.44 kg/m²   OB Status Postmenopausal   Smoking Status Never   BSA 1.89 m²     Physical Exam  Vitals reviewed.   Constitutional:       General: She is not in acute distress.     Appearance: Normal appearance. She is not ill-appearing.   HENT:      Head: Normocephalic and atraumatic.      Right Ear: Tympanic membrane and external ear normal.      Left Ear: Tympanic membrane and external ear normal.      Nose: Nose " normal.      Mouth/Throat:      Mouth: Mucous membranes are moist.      Pharynx: Oropharynx is clear.   Eyes:      Conjunctiva/sclera: Conjunctivae normal.      Pupils: Pupils are equal, round, and reactive to light.   Cardiovascular:      Rate and Rhythm: Normal rate and regular rhythm.      Heart sounds: Murmur heard.   Pulmonary:      Effort: Pulmonary effort is normal. No respiratory distress.      Breath sounds: Normal breath sounds. No wheezing.   Abdominal:      General: There is no distension.      Palpations: Abdomen is soft. There is no mass.      Tenderness: There is no abdominal tenderness.   Musculoskeletal:         General: Normal range of motion.      Cervical back: Normal range of motion and neck supple.   Skin:     General: Skin is warm and dry.   Neurological:      General: No focal deficit present.      Mental Status: She is alert and oriented to person, place, and time.      Sensory: No sensory deficit.      Motor: No weakness.      Coordination: Coordination normal.      Gait: Gait normal.   Psychiatric:         Mood and Affect: Mood normal.         Behavior: Behavior normal.       Current Outpatient Medications   Medication Instructions    amLODIPine (NORVASC) 5 mg, oral, Daily    atorvastatin (LIPITOR) 20 mg, oral, Daily    cyanocobalamin (VITAMIN B-12) 1,000 mcg, oral, Daily    L.acidoph/B.animalis/B.longum (FLORAJEN DIGESTION ORAL) 1 capsule, oral, Daily    magnesium 250 mg tablet 1 tablet, oral, Daily    omeprazole (PRILOSEC) 40 mg, oral, Daily      Lab Results   Component Value Date    WBC 5.5 07/22/2023    HGB 13.0 07/22/2023    HCT 40.7 07/22/2023     07/22/2023    CHOL 230 (H) 07/22/2023    TRIG 80 07/22/2023    HDL 57.6 07/22/2023    ALT 16 07/22/2023    AST 21 07/22/2023     07/22/2023    K 3.8 07/22/2023     07/22/2023    CREATININE 0.84 07/22/2023    BUN 15 07/22/2023    CO2 24 07/22/2023    TSH 0.81 07/22/2023     Problem List Items Addressed This Visit              ICD-10-CM    HTN (hypertension) I10     - c/w amlodipine         Palpitations R00.2    Relevant Orders    Transthoracic Echo (TTE) Complete    Murmur R01.1     - will get echocardiogram         Relevant Orders    Transthoracic Echo (TTE) Complete    Dyslipidemia E78.5     - c/w statin         Relevant Orders    Lipid Panel    Health care maintenance Z00.00     - will check labs         Relevant Orders    CBC and Auto Differential    Comprehensive Metabolic Panel    Lipid Panel    TSH with reflex to Free T4 if abnormal    Vitamin D 25-Hydroxy,Total (for eval of Vitamin D levels)    Vitamin B12    Dizziness R42     Increased stress at work.  Has been having dizziness/lightheadedness. Took her BP and it was >140 SBP.   She drank a lot of water and rehydrated herself therefore felt improved.  For ~1.5 wks she has felt dizzy to where she has to sit on the edge of the bed.          Other Visit Diagnoses         Codes    Lipid screening     Z13.220    Relevant Orders    Lipid Panel    Breast screening     Z12.39    Relevant Orders    BI mammo bilateral screening tomosynthesis            --------------------  Written by Traci Roldan RN, acting as a scribe for Dr. Wallis. This note accurately reflects the work and decisions made by Dr. Wallis.     I, Dr. Wallis, attest all medical record entries made by the scribe were under my direction and were personally dictated by me. I have reviewed the chart and agree that the record accurately reflects my performance of the history, physical exam, and assessment and plan.

## 2024-08-09 PROBLEM — R42 DIZZINESS: Status: ACTIVE | Noted: 2024-08-09

## 2024-08-09 PROBLEM — R01.1 MURMUR: Status: ACTIVE | Noted: 2024-08-09

## 2024-08-09 PROBLEM — Z00.00 HEALTH CARE MAINTENANCE: Status: ACTIVE | Noted: 2024-08-09

## 2024-08-09 PROBLEM — E78.5 DYSLIPIDEMIA: Status: ACTIVE | Noted: 2024-08-09

## 2024-08-09 ASSESSMENT — ENCOUNTER SYMPTOMS
MYALGIAS: 1
LIGHT-HEADEDNESS: 1

## 2024-08-09 NOTE — ASSESSMENT & PLAN NOTE
Increased stress at work.  Has been having dizziness/lightheadedness. Took her BP and it was >140 SBP.   She drank a lot of water and rehydrated herself therefore felt improved.  For ~1.5 wks she has felt dizzy to where she has to sit on the edge of the bed.

## 2024-08-17 ENCOUNTER — LAB (OUTPATIENT)
Dept: LAB | Facility: LAB | Age: 59
End: 2024-08-17
Payer: COMMERCIAL

## 2024-08-17 DIAGNOSIS — Z00.00 HEALTH CARE MAINTENANCE: ICD-10-CM

## 2024-08-17 DIAGNOSIS — E78.5 DYSLIPIDEMIA: ICD-10-CM

## 2024-08-17 DIAGNOSIS — Z13.220 LIPID SCREENING: ICD-10-CM

## 2024-08-17 LAB
25(OH)D3 SERPL-MCNC: 42 NG/ML (ref 30–100)
ALBUMIN SERPL BCP-MCNC: 4 G/DL (ref 3.4–5)
ALP SERPL-CCNC: 85 U/L (ref 33–136)
ALT SERPL W P-5'-P-CCNC: 12 U/L (ref 7–45)
ANION GAP SERPL CALC-SCNC: 8 MMOL/L (ref 10–20)
AST SERPL W P-5'-P-CCNC: 16 U/L (ref 9–39)
BASOPHILS # BLD AUTO: 0.04 X10*3/UL (ref 0–0.1)
BASOPHILS NFR BLD AUTO: 0.7 %
BILIRUB SERPL-MCNC: 0.4 MG/DL (ref 0–1.2)
BUN SERPL-MCNC: 12 MG/DL (ref 6–23)
CALCIUM SERPL-MCNC: 9.4 MG/DL (ref 8.6–10.3)
CHLORIDE SERPL-SCNC: 106 MMOL/L (ref 98–107)
CHOLEST SERPL-MCNC: 135 MG/DL (ref 0–199)
CHOLESTEROL/HDL RATIO: 2.5
CO2 SERPL-SCNC: 30 MMOL/L (ref 21–32)
CREAT SERPL-MCNC: 0.86 MG/DL (ref 0.5–1.05)
EGFRCR SERPLBLD CKD-EPI 2021: 77 ML/MIN/1.73M*2
EOSINOPHIL # BLD AUTO: 0.08 X10*3/UL (ref 0–0.7)
EOSINOPHIL NFR BLD AUTO: 1.4 %
ERYTHROCYTE [DISTWIDTH] IN BLOOD BY AUTOMATED COUNT: 13.2 % (ref 11.5–14.5)
GLUCOSE SERPL-MCNC: 88 MG/DL (ref 74–99)
HCT VFR BLD AUTO: 41.3 % (ref 36–46)
HDLC SERPL-MCNC: 53.6 MG/DL
HGB BLD-MCNC: 12.8 G/DL (ref 12–16)
IMM GRANULOCYTES # BLD AUTO: 0.01 X10*3/UL (ref 0–0.7)
IMM GRANULOCYTES NFR BLD AUTO: 0.2 % (ref 0–0.9)
LDLC SERPL CALC-MCNC: 65 MG/DL
LYMPHOCYTES # BLD AUTO: 1.53 X10*3/UL (ref 1.2–4.8)
LYMPHOCYTES NFR BLD AUTO: 26.6 %
MCH RBC QN AUTO: 31.4 PG (ref 26–34)
MCHC RBC AUTO-ENTMCNC: 31 G/DL (ref 32–36)
MCV RBC AUTO: 102 FL (ref 80–100)
MONOCYTES # BLD AUTO: 0.4 X10*3/UL (ref 0.1–1)
MONOCYTES NFR BLD AUTO: 7 %
NEUTROPHILS # BLD AUTO: 3.69 X10*3/UL (ref 1.2–7.7)
NEUTROPHILS NFR BLD AUTO: 64.1 %
NON HDL CHOLESTEROL: 81 MG/DL (ref 0–149)
NRBC BLD-RTO: 0 /100 WBCS (ref 0–0)
PLATELET # BLD AUTO: 284 X10*3/UL (ref 150–450)
POTASSIUM SERPL-SCNC: 4 MMOL/L (ref 3.5–5.3)
PROT SERPL-MCNC: 7 G/DL (ref 6.4–8.2)
RBC # BLD AUTO: 4.07 X10*6/UL (ref 4–5.2)
SODIUM SERPL-SCNC: 140 MMOL/L (ref 136–145)
TRIGL SERPL-MCNC: 82 MG/DL (ref 0–149)
TSH SERPL-ACNC: 1.07 MIU/L (ref 0.44–3.98)
VIT B12 SERPL-MCNC: 395 PG/ML (ref 211–911)
VLDL: 16 MG/DL (ref 0–40)
WBC # BLD AUTO: 5.8 X10*3/UL (ref 4.4–11.3)

## 2024-08-17 PROCEDURE — 82607 VITAMIN B-12: CPT

## 2024-08-17 PROCEDURE — 36415 COLL VENOUS BLD VENIPUNCTURE: CPT

## 2024-08-17 PROCEDURE — 82306 VITAMIN D 25 HYDROXY: CPT

## 2024-08-19 DIAGNOSIS — E53.8 DEFICIENCY OF VITAMIN B12: Primary | ICD-10-CM

## 2024-08-19 RX ORDER — UBIDECARENONE 75 MG
500 CAPSULE ORAL DAILY
Qty: 30 TABLET | Refills: 11 | Status: SHIPPED | OUTPATIENT
Start: 2024-08-19 | End: 2025-08-19

## 2024-09-16 ENCOUNTER — TELEPHONE (OUTPATIENT)
Dept: PRIMARY CARE | Facility: CLINIC | Age: 59
End: 2024-09-16
Payer: COMMERCIAL

## 2024-09-16 DIAGNOSIS — R42 DIZZINESS: Primary | ICD-10-CM

## 2024-09-16 RX ORDER — MECLIZINE HCL 12.5 MG 12.5 MG/1
12.5 TABLET ORAL 3 TIMES DAILY PRN
Qty: 30 TABLET | Refills: 0 | Status: SHIPPED | OUTPATIENT
Start: 2024-09-16 | End: 2025-09-16

## 2024-10-14 DIAGNOSIS — E78.5 DYSLIPIDEMIA: ICD-10-CM

## 2024-10-14 DIAGNOSIS — I10 HYPERTENSION, UNSPECIFIED TYPE: ICD-10-CM

## 2024-10-14 RX ORDER — ATORVASTATIN CALCIUM 20 MG/1
20 TABLET, FILM COATED ORAL DAILY
Qty: 90 TABLET | Refills: 0 | Status: SHIPPED | OUTPATIENT
Start: 2024-10-14 | End: 2025-04-12

## 2024-10-14 RX ORDER — AMLODIPINE BESYLATE 5 MG/1
5 TABLET ORAL DAILY
Qty: 90 TABLET | Refills: 0 | Status: SHIPPED | OUTPATIENT
Start: 2024-10-14

## 2025-01-13 DIAGNOSIS — E78.5 DYSLIPIDEMIA: ICD-10-CM

## 2025-01-13 DIAGNOSIS — K21.9 GASTROESOPHAGEAL REFLUX DISEASE WITHOUT ESOPHAGITIS: ICD-10-CM

## 2025-01-13 DIAGNOSIS — I10 HYPERTENSION, UNSPECIFIED TYPE: ICD-10-CM

## 2025-01-13 RX ORDER — ATORVASTATIN CALCIUM 20 MG/1
20 TABLET, FILM COATED ORAL DAILY
Qty: 90 TABLET | Refills: 0 | Status: SHIPPED | OUTPATIENT
Start: 2025-01-13 | End: 2025-07-12

## 2025-01-13 RX ORDER — AMLODIPINE BESYLATE 5 MG/1
5 TABLET ORAL DAILY
Qty: 90 TABLET | Refills: 0 | Status: SHIPPED | OUTPATIENT
Start: 2025-01-13

## 2025-01-13 RX ORDER — OMEPRAZOLE 40 MG/1
40 CAPSULE, DELAYED RELEASE ORAL DAILY
Qty: 90 CAPSULE | Refills: 0 | Status: SHIPPED | OUTPATIENT
Start: 2025-01-13

## 2025-02-03 ENCOUNTER — APPOINTMENT (OUTPATIENT)
Dept: PRIMARY CARE | Facility: CLINIC | Age: 60
End: 2025-02-03
Payer: COMMERCIAL

## 2025-02-03 VITALS
BODY MASS INDEX: 27.8 KG/M2 | DIASTOLIC BLOOD PRESSURE: 79 MMHG | HEIGHT: 66 IN | OXYGEN SATURATION: 96 % | WEIGHT: 173 LBS | HEART RATE: 68 BPM | SYSTOLIC BLOOD PRESSURE: 128 MMHG

## 2025-02-03 DIAGNOSIS — K21.9 GASTROESOPHAGEAL REFLUX DISEASE WITHOUT ESOPHAGITIS: ICD-10-CM

## 2025-02-03 DIAGNOSIS — E78.5 DYSLIPIDEMIA: ICD-10-CM

## 2025-02-03 DIAGNOSIS — I15.9 SECONDARY HYPERTENSION: ICD-10-CM

## 2025-02-03 DIAGNOSIS — R42 DIZZINESS: ICD-10-CM

## 2025-02-03 PROCEDURE — 3074F SYST BP LT 130 MM HG: CPT | Performed by: INTERNAL MEDICINE

## 2025-02-03 PROCEDURE — 99213 OFFICE O/P EST LOW 20 MIN: CPT | Performed by: INTERNAL MEDICINE

## 2025-02-03 PROCEDURE — 3008F BODY MASS INDEX DOCD: CPT | Performed by: INTERNAL MEDICINE

## 2025-02-03 PROCEDURE — 3078F DIAST BP <80 MM HG: CPT | Performed by: INTERNAL MEDICINE

## 2025-02-03 PROCEDURE — 1036F TOBACCO NON-USER: CPT | Performed by: INTERNAL MEDICINE

## 2025-02-03 ASSESSMENT — PAIN SCALES - GENERAL: PAINLEVEL_OUTOF10: 0-NO PAIN

## 2025-02-03 NOTE — PROGRESS NOTES
"Patient ID: She states her dizziness has pretty much resolved. She states she does a lot of walking at work. She states she has been sleeping ok. She states that she drinks a lot of water at night, gets very thirsty. She denies any CP, SOB or cough. She states she has been taking her vitamin b12 supplement. She states she has foot pain, has bunions on both feet. Not currently seeing anyone for them, has had surgery in the past.     HEALTH MAINTENANCE: FOLLOW UP   Last Office Visit: 8/6/24  Mammogram (40-75): 7/22/2023 -ve -> DUE, ordered previously not done yet  Pap smear (21-65, or hysterectomy q5yrs): 2021 -ve  Last Labs: 8/17/24 wnl other than   Colonoscopy (45-75 or age 40 with 1st degree relative dx colon ca): 2018. Had an EGD on 2/13/2024 with Dr Montes.   Carotid US 1/6/2022 showing <50% bilaterally     HPI Ruchi Yun is a 59 y.o. female with PMH remarkable for HTN, HLD who presents to the office today for Hypertension.    Social History     Tobacco Use    Smoking status: Never    Smokeless tobacco: Never   Vaping Use    Vaping status: Never Used   Substance Use Topics    Alcohol use: Never    Drug use: Never     Review of Systems   Constitutional: Negative.    HENT: Negative.     Respiratory: Negative.     Cardiovascular: Negative.    Gastrointestinal: Negative.    Genitourinary: Negative.    Musculoskeletal:         + bilateral foot pain   Skin: Negative.    Neurological: Negative.    Psychiatric/Behavioral: Negative.       Visit Vitals  /79 (BP Location: Right arm)   Pulse 68   Ht 1.676 m (5' 6\")   Wt 78.5 kg (173 lb)   SpO2 96%   BMI 27.92 kg/m²   OB Status Postmenopausal   Smoking Status Never   BSA 1.91 m²     Physical Exam  Vitals reviewed.   Constitutional:       Appearance: Normal appearance.   HENT:      Head: Normocephalic and atraumatic.   Cardiovascular:      Rate and Rhythm: Normal rate and regular rhythm.      Pulses: Normal pulses.      Heart sounds: Normal heart sounds.   Pulmonary: "      Effort: Pulmonary effort is normal.      Breath sounds: Normal breath sounds.   Abdominal:      General: Bowel sounds are normal.      Palpations: Abdomen is soft.   Musculoskeletal:         General: Normal range of motion.      Cervical back: Normal range of motion and neck supple.   Skin:     General: Skin is warm and dry.   Neurological:      General: No focal deficit present.      Mental Status: She is alert and oriented to person, place, and time.   Psychiatric:         Mood and Affect: Mood normal.         Behavior: Behavior normal.       Current Outpatient Medications   Medication Instructions    amLODIPine (NORVASC) 5 mg, oral, Daily    atorvastatin (LIPITOR) 20 mg, oral, Daily    cyanocobalamin (VITAMIN B-12) 500 mcg, oral, Daily    L.acidoph/B.animalis/B.longum (FLORAJEN DIGESTION ORAL) 1 capsule, Daily    magnesium 250 mg tablet 1 tablet, Daily    meclizine (ANTIVERT) 12.5 mg, oral, 3 times daily PRN    omeprazole (PRILOSEC) 40 mg, oral, Daily      Lab Results   Component Value Date    WBC 5.8 08/17/2024    HGB 12.8 08/17/2024    HCT 41.3 08/17/2024     08/17/2024    CHOL 135 08/17/2024    TRIG 82 08/17/2024    HDL 53.6 08/17/2024    ALT 12 08/17/2024    AST 16 08/17/2024     08/17/2024    K 4.0 08/17/2024     08/17/2024    CREATININE 0.86 08/17/2024    BUN 12 08/17/2024    CO2 30 08/17/2024    TSH 1.07 08/17/2024       Problem List Items Addressed This Visit             ICD-10-CM    HTN (hypertension) I10     BP is ok 128/79  Continue with Amlodipine 5mg daily         Gastroesophageal reflux disease K21.9     Stable on PPI         Dyslipidemia E78.5     Continue with statin         Dizziness R42     She reports dizziness almost completely resolved  Advised to schedule 2D echo previously ordered            --------------------  Written by Margarita Martinez RN, acting as a scribe for Dr. Wallis. This note accurately reflects the work and decisions made by Dr. Wallis.     I, Dr. Wallis, attest all  medical record entries made by the scribe were under my direction and were personally dictated by me. I have reviewed the chart and agree that the record accurately reflects my performance of the history, physical exam, and assessment and plan.

## 2025-02-03 NOTE — PATIENT INSTRUCTIONS
It was great to see you in the office today! Here is what we discussed at your visit today:    Please continue to take your current medications     I have ordered you a mammogram to be done as soon as you are able.  Someone will call you soon to schedule this.   We will call the results.    An ultrasound of your heart was previously ordered, schedule this as soon as you are able. We will call you with results    Follow up in six months

## 2025-02-04 ASSESSMENT — ENCOUNTER SYMPTOMS
PSYCHIATRIC NEGATIVE: 1
CARDIOVASCULAR NEGATIVE: 1
RESPIRATORY NEGATIVE: 1
CONSTITUTIONAL NEGATIVE: 1
NEUROLOGICAL NEGATIVE: 1
GASTROINTESTINAL NEGATIVE: 1

## 2025-02-26 ENCOUNTER — APPOINTMENT (OUTPATIENT)
Dept: RADIOLOGY | Facility: HOSPITAL | Age: 60
End: 2025-02-26
Payer: COMMERCIAL

## 2025-02-26 ENCOUNTER — APPOINTMENT (OUTPATIENT)
Dept: CARDIOLOGY | Facility: HOSPITAL | Age: 60
End: 2025-02-26
Payer: COMMERCIAL

## 2025-04-05 ENCOUNTER — HOSPITAL ENCOUNTER (OUTPATIENT)
Dept: RADIOLOGY | Facility: HOSPITAL | Age: 60
Discharge: HOME | End: 2025-04-05
Payer: COMMERCIAL

## 2025-04-05 VITALS — WEIGHT: 173 LBS | HEIGHT: 66 IN | BODY MASS INDEX: 27.8 KG/M2

## 2025-04-05 DIAGNOSIS — Z12.39 BREAST SCREENING: ICD-10-CM

## 2025-04-05 PROCEDURE — 77063 BREAST TOMOSYNTHESIS BI: CPT | Performed by: RADIOLOGY

## 2025-04-05 PROCEDURE — 77067 SCR MAMMO BI INCL CAD: CPT | Performed by: RADIOLOGY

## 2025-04-05 PROCEDURE — 77067 SCR MAMMO BI INCL CAD: CPT

## 2025-04-05 PROCEDURE — 77063 BREAST TOMOSYNTHESIS BI: CPT

## 2025-04-07 DIAGNOSIS — R92.8 ABNORMAL SCREENING MAMMOGRAM: Primary | ICD-10-CM

## 2025-04-15 ENCOUNTER — HOSPITAL ENCOUNTER (OUTPATIENT)
Dept: RADIOLOGY | Facility: HOSPITAL | Age: 60
Discharge: HOME | End: 2025-04-15
Payer: COMMERCIAL

## 2025-04-15 DIAGNOSIS — N63.10 BREAST MASS, RIGHT: ICD-10-CM

## 2025-04-15 DIAGNOSIS — R92.8 ABNORMAL SCREENING MAMMOGRAM: ICD-10-CM

## 2025-04-15 DIAGNOSIS — N63.10 MASS OF RIGHT BREAST, UNSPECIFIED QUADRANT: Primary | ICD-10-CM

## 2025-04-15 PROCEDURE — 76982 USE 1ST TARGET LESION: CPT | Mod: RT

## 2025-04-15 PROCEDURE — 76642 ULTRASOUND BREAST LIMITED: CPT | Mod: RT

## 2025-04-15 PROCEDURE — 77061 BREAST TOMOSYNTHESIS UNI: CPT | Mod: RIGHT SIDE | Performed by: RADIOLOGY

## 2025-04-15 PROCEDURE — 77061 BREAST TOMOSYNTHESIS UNI: CPT | Mod: RT

## 2025-04-15 PROCEDURE — 76642 ULTRASOUND BREAST LIMITED: CPT | Mod: RIGHT SIDE | Performed by: RADIOLOGY

## 2025-04-15 PROCEDURE — 77065 DX MAMMO INCL CAD UNI: CPT | Mod: RIGHT SIDE | Performed by: RADIOLOGY

## 2025-04-23 DIAGNOSIS — E78.5 DYSLIPIDEMIA: ICD-10-CM

## 2025-04-23 DIAGNOSIS — I10 HYPERTENSION, UNSPECIFIED TYPE: ICD-10-CM

## 2025-04-23 DIAGNOSIS — K21.9 GASTROESOPHAGEAL REFLUX DISEASE WITHOUT ESOPHAGITIS: ICD-10-CM

## 2025-04-23 RX ORDER — AMLODIPINE BESYLATE 5 MG/1
5 TABLET ORAL DAILY
Qty: 90 TABLET | Refills: 0 | Status: SHIPPED | OUTPATIENT
Start: 2025-04-23

## 2025-04-23 RX ORDER — OMEPRAZOLE 40 MG/1
40 CAPSULE, DELAYED RELEASE ORAL DAILY
Qty: 90 CAPSULE | Refills: 0 | Status: SHIPPED | OUTPATIENT
Start: 2025-04-23

## 2025-04-23 RX ORDER — ATORVASTATIN CALCIUM 20 MG/1
20 TABLET, FILM COATED ORAL DAILY
Qty: 90 TABLET | Refills: 0 | Status: SHIPPED | OUTPATIENT
Start: 2025-04-23 | End: 2025-10-20

## 2025-07-22 DIAGNOSIS — E78.5 DYSLIPIDEMIA: ICD-10-CM

## 2025-07-22 DIAGNOSIS — K21.9 GASTROESOPHAGEAL REFLUX DISEASE WITHOUT ESOPHAGITIS: ICD-10-CM

## 2025-07-22 DIAGNOSIS — I10 HYPERTENSION, UNSPECIFIED TYPE: ICD-10-CM

## 2025-07-22 RX ORDER — ATORVASTATIN CALCIUM 20 MG/1
20 TABLET, FILM COATED ORAL DAILY
Qty: 90 TABLET | Refills: 0 | Status: SHIPPED | OUTPATIENT
Start: 2025-07-22 | End: 2026-01-18

## 2025-07-22 RX ORDER — OMEPRAZOLE 40 MG/1
40 CAPSULE, DELAYED RELEASE ORAL DAILY
Qty: 90 CAPSULE | Refills: 0 | Status: SHIPPED | OUTPATIENT
Start: 2025-07-22

## 2025-07-22 RX ORDER — AMLODIPINE BESYLATE 5 MG/1
5 TABLET ORAL DAILY
Qty: 90 TABLET | Refills: 0 | Status: SHIPPED | OUTPATIENT
Start: 2025-07-22

## 2025-07-25 PROBLEM — R92.8 ABNORMAL MAMMOGRAM OF RIGHT BREAST: Status: ACTIVE | Noted: 2025-07-25

## 2025-07-25 NOTE — ASSESSMENT & PLAN NOTE
- Screening mammogram 4/5/25: A newly developing tiny right inner lower focal asymmetry should be further evaluated with ultrasound.   - Right breast US 4/15/25: Probably benign mass, 5:30 position right breast 3 cm from the nipple. Repeat right ultrasound in 6 months recommended to document stability.  - Due for repeat US 10/15/25

## 2025-07-25 NOTE — ASSESSMENT & PLAN NOTE
Lab Results   Component Value Date    CHOL 135 08/17/2024    TRIG 82 08/17/2024    HDL 53.6 08/17/2024    CHHDL 2.5 08/17/2024    LDLF 156 (H) 07/22/2023    VLDL 16 08/17/2024    NHDL 81 08/17/2024    - Continue atorvastatin  - Recheck lipid panel with labs  - Low fat/cholesterol diet and regular activity encouraged   - Goal LDL < 70

## 2025-07-25 NOTE — ASSESSMENT & PLAN NOTE
- BP today  - Continue amlodipine  - Regular exercise and blood pressure monitoring is encouraged

## 2025-07-29 ENCOUNTER — APPOINTMENT (OUTPATIENT)
Dept: PRIMARY CARE | Facility: CLINIC | Age: 60
End: 2025-07-29
Payer: COMMERCIAL

## 2025-07-29 VITALS
HEIGHT: 66 IN | OXYGEN SATURATION: 96 % | SYSTOLIC BLOOD PRESSURE: 112 MMHG | BODY MASS INDEX: 27.32 KG/M2 | DIASTOLIC BLOOD PRESSURE: 74 MMHG | RESPIRATION RATE: 18 BRPM | HEART RATE: 71 BPM | WEIGHT: 170 LBS

## 2025-07-29 DIAGNOSIS — E53.8 LOW SERUM VITAMIN B12: ICD-10-CM

## 2025-07-29 DIAGNOSIS — I15.9 SECONDARY HYPERTENSION: ICD-10-CM

## 2025-07-29 DIAGNOSIS — K21.9 GASTROESOPHAGEAL REFLUX DISEASE WITHOUT ESOPHAGITIS: ICD-10-CM

## 2025-07-29 DIAGNOSIS — R92.8 ABNORMAL MAMMOGRAM OF RIGHT BREAST: ICD-10-CM

## 2025-07-29 DIAGNOSIS — M54.2 NECK PAIN: ICD-10-CM

## 2025-07-29 DIAGNOSIS — E78.5 DYSLIPIDEMIA: ICD-10-CM

## 2025-07-29 DIAGNOSIS — Z00.00 HEALTH CARE MAINTENANCE: Primary | ICD-10-CM

## 2025-07-29 PROCEDURE — 3008F BODY MASS INDEX DOCD: CPT

## 2025-07-29 PROCEDURE — 3074F SYST BP LT 130 MM HG: CPT

## 2025-07-29 PROCEDURE — 1036F TOBACCO NON-USER: CPT

## 2025-07-29 PROCEDURE — 3078F DIAST BP <80 MM HG: CPT

## 2025-07-29 PROCEDURE — 99396 PREV VISIT EST AGE 40-64: CPT

## 2025-07-29 ASSESSMENT — ENCOUNTER SYMPTOMS
DIARRHEA: 0
JOINT SWELLING: 0
VOMITING: 0
WEAKNESS: 0
COUGH: 0
BACK PAIN: 0
FREQUENCY: 0
NUMBNESS: 0
ACTIVITY CHANGE: 0
NAUSEA: 0
ABDOMINAL PAIN: 0
DIFFICULTY URINATING: 0
SHORTNESS OF BREATH: 0
CHILLS: 0
DIZZINESS: 0
LIGHT-HEADEDNESS: 0
NECK PAIN: 1
FEVER: 0
NECK STIFFNESS: 0
DYSURIA: 0
APPETITE CHANGE: 0
CONSTIPATION: 0
HEADACHES: 0

## 2025-07-29 ASSESSMENT — PAIN SCALES - GENERAL: PAINLEVEL_OUTOF10: 0-NO PAIN

## 2025-07-29 NOTE — PROGRESS NOTES
Subjective   Patient ID:   Ruchi Yun is a 60 y.o. female who presents for Annual Exam.  HPI  Pain scale: 0 (no pain)  Living will? No  POA? No  Are you currently or have you recently been threatened or abused? No  Do you feel unsafe going back to the place you are living? No  Reported health: Good  Dental visits? Yes  Hearing problems? No  Vision problems? Yes - does not wear glasses or contacts  Healthy diet? Yes  Exercise? Exercise 1-3x per week  Adequate fluid intake? No    Social History[1]    Immunization History   Administered Date(s) Administered    Flu vaccine (IIV4), preservative free *Check age/dose* 11/10/2016, 01/06/2023    Influenza, Unspecified 10/20/2021    Influenza, injectable, quadrivalent 10/20/2021    Moderna SARS-CoV-2 Vaccination 01/15/2021, 02/12/2021, 12/07/2021    Novel influenza-H1N1-09, preservative-free 12/23/2009    Td (adult), unspecified 11/21/2002       Review of Systems  12 point review of systems negative unless stated above in HPI    There were no vitals filed for this visit.    Physical Exam  General: Alert and oriented, well nourished, no acute distress.  Lungs: Clear to auscultation, non-labored respiration.  Heart: Normal rate, regular rhythm, no murmur, gallop or edema.  Neurologic: Awake, alert, and oriented X3, CN II-XII intact.  Psychiatric: Cooperative, appropriate mood and affect.    Assessment/Plan          [1]   Social History  Tobacco Use    Smoking status: Never    Smokeless tobacco: Never   Vaping Use    Vaping status: Never Used   Substance Use Topics    Alcohol use: Never    Drug use: Never

## 2025-07-29 NOTE — ASSESSMENT & PLAN NOTE
- Pt reports an intermittent tingling sensation in her posterior neck; no extremity numbness/weakness, no back pain. She notices the pain more when she is stressed  - No neck imaging in chart  - Continue with OTC pain meds as needed- pt reports improvement in pain with Tylenol/ibuprofen  - Heat/ice as tolerated  - Gentle stretching/ROM exercises

## 2025-08-17 DIAGNOSIS — E53.8 LOW SERUM VITAMIN B12: ICD-10-CM

## 2025-08-17 DIAGNOSIS — E78.5 DYSLIPIDEMIA: ICD-10-CM

## 2025-08-26 ENCOUNTER — APPOINTMENT (OUTPATIENT)
Dept: PRIMARY CARE | Facility: CLINIC | Age: 60
End: 2025-08-26
Payer: COMMERCIAL